# Patient Record
Sex: FEMALE
[De-identification: names, ages, dates, MRNs, and addresses within clinical notes are randomized per-mention and may not be internally consistent; named-entity substitution may affect disease eponyms.]

---

## 2021-10-05 ENCOUNTER — APPOINTMENT (OUTPATIENT)
Dept: CT IMAGING | Facility: CLINIC | Age: 61
End: 2021-10-05

## 2021-12-09 ENCOUNTER — APPOINTMENT (OUTPATIENT)
Dept: ORTHOPEDIC SURGERY | Facility: CLINIC | Age: 61
End: 2021-12-09
Payer: COMMERCIAL

## 2021-12-09 VITALS — BODY MASS INDEX: 27.31 KG/M2 | HEIGHT: 64 IN | WEIGHT: 160 LBS

## 2021-12-09 DIAGNOSIS — Z78.9 OTHER SPECIFIED HEALTH STATUS: ICD-10-CM

## 2021-12-09 DIAGNOSIS — Z72.3 LACK OF PHYSICAL EXERCISE: ICD-10-CM

## 2021-12-09 PROCEDURE — 99204 OFFICE O/P NEW MOD 45 MIN: CPT

## 2021-12-10 PROBLEM — Z78.9 NON-SMOKER: Status: ACTIVE | Noted: 2021-12-09

## 2021-12-10 PROBLEM — Z72.3 DOES NOT EXERCISE: Status: ACTIVE | Noted: 2021-12-09

## 2021-12-10 PROBLEM — Z78.9 DOES NOT USE ILLICIT DRUGS: Status: ACTIVE | Noted: 2021-12-09

## 2021-12-15 NOTE — HISTORY OF PRESENT ILLNESS
[de-identified] : 61 year old female presents with acute exacerbation of chronic back pain radiating down her right leg.  She denies injury.  She has had epidural injections in the past without relief.  She denies recent illness, fevers, numbness, weakness, balance problems, saddle anesthesia, urinary retention or fecal incontinence.

## 2021-12-15 NOTE — ASSESSMENT
[FreeTextEntry1] : 61 year old female presents with acute exacerbation of chronic back pain radiating down her right leg. She is otherwise neurologically intact. We reviewed her radiographs and lumbar MRI.  She has degenerative changes without compression of the neural elements.  She was given prescriptions for gabapentin and meloxicam.  She was given a referral for physical therapy. She will followup in 6 weeks. We discussed red flag symptoms that would require emergent evaluation. She knows to call with any questions or concerns or if her symptoms acutely worsen.

## 2021-12-15 NOTE — PHYSICAL EXAM
[de-identified] : General: No acute distress, conversant, well-nourished.\par Head: Normocephalic, atraumatic\par Neck: trachea midline, FROM\par Heart: normotensive and normal rate and rhythm\par Lungs: No labored breathing\par Skin: No abrasions, no rashes, no edema\par Psych: Alert and oriented to person, place and time\par Extremities: no peripheral edema or digital cyanosis\par Gait: Normal gait. Can perform tandem gait.  \par Vascular: warm and well perfused distally, palpable distal pulses\par \par MSK:\par Lumbar spine:\par No tenderness to palpation.  No step-off, no deformity.\par \par NEURO EXAM:\par Sensation \par Left L2  -  2/2            \par Left L3  -  2/2\par Left L4  -  2/2\par Left L5  -  2/2\par Left S1  -  2/2\par \par Right L2  -  2/2            \par Right L3  -  2/2\par Right L4  -  2/2\par Right L5  -  2/2\par Right S1  -  2/2\par \par Motor: \par Left L2 (hip flexion)                            5/5                \par Left L3 (knee extension)                   5/5                \par Left L4 (ankle dorsiflexion)                 5/5                \par Left L5 (long toe extensor)                5/5                \par Left S1 (ankle plantar flexion)           5/5\par \par Right L2 (hip flexion)                            5/5                \par Right L3 (knee extension)                   5/5                \par Right L4 (ankle dorsiflexion)                 5/5                \par Right L5 (long toe extensor)                5/5                \par Right S1 (ankle plantar flexion)           5/5\par \par Reflexes: Normal and symmetric\par Negative clonus.  Down-going Babinski.   [de-identified] : Lumbar Radiographs (10/9/21): Dextroscoliosis of the lumbar spine approximately 8.7 degrees\par \par Multilevel disc space narrowing with spondylosis and facet arthropathy, grossly unchanged from the prior study.\par \par No spondylolisthesis or dynamic instability\par \par Lumbar MRI (11/19/21): 1. L5-S1 annular bulge abutting the thecal sac with moderate foraminal narrowing.\par \par 2. L4-5 annular bulge with superimposed left-sided disc herniation and annular tear. There is bilateral recess stenosis and foraminal narrowing left significantly greater than right.\par \par 3. L3-4 annular bulge flattening the thecal sac with moderate foraminal narrowing.

## 2022-04-21 ENCOUNTER — APPOINTMENT (OUTPATIENT)
Dept: ORTHOPEDIC SURGERY | Facility: CLINIC | Age: 62
End: 2022-04-21
Payer: COMMERCIAL

## 2022-04-21 PROCEDURE — 99214 OFFICE O/P EST MOD 30 MIN: CPT

## 2022-05-10 NOTE — ASSESSMENT
[FreeTextEntry1] : 61 year old female followup with acute exacerbation of chronic back pain radiating down her right leg. She is otherwise neurologically intact. We reviewed her radiographs and lumbar MRI.  She has degenerative changes without compression of the neural elements.  She was given prescriptions for gabapentin and meloxicam.  She was given a referral for physical therapy. She will followup in 6 weeks. We discussed red flag symptoms that would require emergent evaluation. She knows to call with any questions or concerns or if her symptoms acutely worsen.

## 2022-05-10 NOTE — HISTORY OF PRESENT ILLNESS
[de-identified] : 61 year old female followup with acute exacerbation of chronic back pain radiating down her right leg.  She denies injury.  She denies recent illness, fevers, numbness, weakness, balance problems, saddle anesthesia, urinary retention or fecal incontinence. SInce her last appointment she has not had much change in her symptoms.  She has not been able to start PT.

## 2022-05-10 NOTE — PHYSICAL EXAM
[de-identified] : General: No acute distress, conversant, well-nourished.\par Head: Normocephalic, atraumatic\par Neck: trachea midline, FROM\par Heart: normotensive and normal rate and rhythm\par Lungs: No labored breathing\par Skin: No abrasions, no rashes, no edema\par Psych: Alert and oriented to person, place and time\par Extremities: no peripheral edema or digital cyanosis\par Gait: Normal gait. Can perform tandem gait.  \par Vascular: warm and well perfused distally, palpable distal pulses\par \par MSK:\par Lumbar spine:\par No tenderness to palpation.  No step-off, no deformity.\par \par NEURO EXAM:\par Sensation \par Left L2  -  2/2            \par Left L3  -  2/2\par Left L4  -  2/2\par Left L5  -  2/2\par Left S1  -  2/2\par \par Right L2  -  2/2            \par Right L3  -  2/2\par Right L4  -  2/2\par Right L5  -  2/2\par Right S1  -  2/2\par \par Motor: \par Left L2 (hip flexion)                            5/5                \par Left L3 (knee extension)                   5/5                \par Left L4 (ankle dorsiflexion)                 5/5                \par Left L5 (long toe extensor)                5/5                \par Left S1 (ankle plantar flexion)           5/5\par \par Right L2 (hip flexion)                            5/5                \par Right L3 (knee extension)                   5/5                \par Right L4 (ankle dorsiflexion)                 5/5                \par Right L5 (long toe extensor)                5/5                \par Right S1 (ankle plantar flexion)           5/5\par \par Reflexes: Normal and symmetric\par Negative clonus.  Down-going Babinski.   [de-identified] : Lumbar Radiographs (10/9/21): Dextroscoliosis of the lumbar spine approximately 8.7 degrees\par \par Multilevel disc space narrowing with spondylosis and facet arthropathy, grossly unchanged from the prior study.\par \par No spondylolisthesis or dynamic instability\par \par Lumbar MRI (11/19/21): 1. L5-S1 annular bulge abutting the thecal sac with moderate foraminal narrowing.\par \par 2. L4-5 annular bulge with superimposed left-sided disc herniation and annular tear. There is bilateral recess stenosis and foraminal narrowing left significantly greater than right.\par \par 3. L3-4 annular bulge flattening the thecal sac with moderate foraminal narrowing.

## 2022-06-21 ENCOUNTER — APPOINTMENT (OUTPATIENT)
Dept: ORTHOPEDIC SURGERY | Facility: CLINIC | Age: 62
End: 2022-06-21
Payer: COMMERCIAL

## 2022-06-21 PROCEDURE — 99214 OFFICE O/P EST MOD 30 MIN: CPT

## 2022-06-21 RX ORDER — GABAPENTIN 300 MG/1
300 CAPSULE ORAL 3 TIMES DAILY
Qty: 270 | Refills: 0 | Status: ACTIVE | COMMUNITY
Start: 2022-06-21 | End: 1900-01-01

## 2022-06-24 NOTE — HISTORY OF PRESENT ILLNESS
[de-identified] : 61 year old female followup with acute exacerbation of chronic back pain radiating down her right leg.  She denies injury.  She denies recent illness, fevers, numbness, weakness, balance problems, saddle anesthesia, urinary retention or fecal incontinence. Since her last appointment she has continued to have pain.  She has been doing PT without relief. She takes diclofenac and cyclobenzaprine which helps.

## 2022-06-24 NOTE — ASSESSMENT
[FreeTextEntry1] : 61 year old female followup with acute exacerbation of chronic back pain radiating down her right leg.  She is otherwise neurologically intact. Since her last appointment she has continued to have pain.  She has been doing PT without relief. She will be sent for a lumbar MRI.  She will continue diclofenac and cyclobenzaprine.  She will continue PT.  She will followup once the MRI has been completed.  We discussed red flag symptoms that would require emergent evaluation. She knows to call with any questions or concerns or if her symptoms acutely worsen.

## 2022-06-24 NOTE — PHYSICAL EXAM
[de-identified] : General: No acute distress, conversant, well-nourished.\par Head: Normocephalic, atraumatic\par Neck: trachea midline, FROM\par Heart: normotensive and normal rate and rhythm\par Lungs: No labored breathing\par Skin: No abrasions, no rashes, no edema\par Psych: Alert and oriented to person, place and time\par Extremities: no peripheral edema or digital cyanosis\par Gait: Normal gait. Can perform tandem gait.  \par Vascular: warm and well perfused distally, palpable distal pulses\par \par MSK:\par Lumbar spine:\par No tenderness to palpation.  No step-off, no deformity.\par \par NEURO EXAM:\par Sensation \par Left L2  -  2/2            \par Left L3  -  2/2\par Left L4  -  2/2\par Left L5  -  2/2\par Left S1  -  2/2\par \par Right L2  -  2/2            \par Right L3  -  2/2\par Right L4  -  2/2\par Right L5  -  2/2\par Right S1  -  2/2\par \par Motor: \par Left L2 (hip flexion)                            5/5                \par Left L3 (knee extension)                   5/5                \par Left L4 (ankle dorsiflexion)                 5/5                \par Left L5 (long toe extensor)                5/5                \par Left S1 (ankle plantar flexion)           5/5\par \par Right L2 (hip flexion)                            5/5                \par Right L3 (knee extension)                   5/5                \par Right L4 (ankle dorsiflexion)                 5/5                \par Right L5 (long toe extensor)                5/5                \par Right S1 (ankle plantar flexion)           5/5\par \par Reflexes: Normal and symmetric\par Negative clonus.  Down-going Babinski.   [de-identified] : Lumbar Radiographs (10/9/21): Dextroscoliosis of the lumbar spine approximately 8.7 degrees\par \par Multilevel disc space narrowing with spondylosis and facet arthropathy, grossly unchanged from the prior study.\par \par No spondylolisthesis or dynamic instability\par \par Lumbar MRI (11/19/21): 1. L5-S1 annular bulge abutting the thecal sac with moderate foraminal narrowing.\par \par 2. L4-5 annular bulge with superimposed left-sided disc herniation and annular tear. There is bilateral recess stenosis and foraminal narrowing left significantly greater than right.\par \par 3. L3-4 annular bulge flattening the thecal sac with moderate foraminal narrowing.

## 2022-06-30 ENCOUNTER — APPOINTMENT (OUTPATIENT)
Dept: ORTHOPEDIC SURGERY | Facility: CLINIC | Age: 62
End: 2022-06-30

## 2022-06-30 PROCEDURE — 99214 OFFICE O/P EST MOD 30 MIN: CPT

## 2022-06-30 NOTE — HISTORY OF PRESENT ILLNESS
[de-identified] : 61 year old female followup with acute exacerbation of chronic back pain radiating down her right leg.  She denies injury.  She denies recent illness, fevers, numbness, weakness, balance problems, saddle anesthesia, urinary retention or fecal incontinence. Since her last appointment she has continued to have pain. She takes diclofenac, gabapentin and cyclobenzaprine which helps. She is here to review her lumbar MRI.

## 2022-06-30 NOTE — PHYSICAL EXAM
[de-identified] : General: No acute distress, conversant, well-nourished.\par Head: Normocephalic, atraumatic\par Neck: trachea midline, FROM\par Heart: normotensive and normal rate and rhythm\par Lungs: No labored breathing\par Skin: No abrasions, no rashes, no edema\par Psych: Alert and oriented to person, place and time\par Extremities: no peripheral edema or digital cyanosis\par Gait: Normal gait. Can perform tandem gait.  \par Vascular: warm and well perfused distally, palpable distal pulses\par \par MSK:\par Lumbar spine:\par No tenderness to palpation.  No step-off, no deformity.\par \par NEURO EXAM:\par Sensation \par Left L2  -  2/2            \par Left L3  -  2/2\par Left L4  -  2/2\par Left L5  -  2/2\par Left S1  -  2/2\par \par Right L2  -  2/2            \par Right L3  -  2/2\par Right L4  -  2/2\par Right L5  -  2/2\par Right S1  -  2/2\par \par Motor: \par Left L2 (hip flexion)                            5/5                \par Left L3 (knee extension)                   5/5                \par Left L4 (ankle dorsiflexion)                 5/5                \par Left L5 (long toe extensor)                5/5                \par Left S1 (ankle plantar flexion)           5/5\par \par Right L2 (hip flexion)                            5/5                \par Right L3 (knee extension)                   5/5                \par Right L4 (ankle dorsiflexion)                 5/5                \par Right L5 (long toe extensor)                5/5                \par Right S1 (ankle plantar flexion)           5/5\par \par Reflexes: Normal and symmetric\par Negative clonus.  Down-going Babinski.   [de-identified] : Lumbar MRI (6/22/22): For purposes of this dictation, the last well-formed disc space will be labeled L5-S1.\par \par OSSEOUS STRUCTURES: Vertebral body heights are preserved. There is redemonstration of degenerative endplate marrow edema at L5-S1 and to a lesser extent at L4-5.\par \par ALIGNMENT: Normal lumbar lordosis is preserved. Mild dextroconvex lumbar curvature. No spondylolisthesis. No spondylolysis within the limitations of MRI.\par \par SPINAL CORD AND CONUS MEDULLARIS: Conus medullaris terminates at the L1 level.\par \par PARASPINAL AND INTRA-ABDOMINAL SOFT TISSUES: There is a partially imaged T2 hyperintensity in the right kidney.\par INCLUDED THORACIC SPINE AND SACRUM: Unremarkable.\par DISCS: There is mild disc space narrowing at L3-4 and multilevel disc desiccation from L3-4 through L5-S1, not substantially changed.\par The following axial levels are imaged and detailed below:\par L1-L2: No disc bulging or herniation. No spinal canal or foraminal stenosis.\par L2-L3: No disc bulging or herniation. No spinal canal or foraminal stenosis.\par L3-L4: There is mild diffuse disc bulging and mild to moderate facet arthrosis/ligamentum hypertrophy contributing to mild thecal sac impingement/mild spinal canal stenosis. There is mild bilateral foraminal narrowing. The findings are not substantially changed.\par L4-L5: There is moderate to marked diffuse disc bulging and mild to moderate facet arthrosis. There is mild spinal canal stenosis and contact of the traversing L5 nerve root sheaths bilaterally. There is moderate bilateral foraminal stenosis. The findings are not substantially changed.\par L5-S1: There is moderate to marked diffuse disc/osteophyte asymmetric to the right, and mild to moderate facet arthrosis contributing to impingement of the ventral thecal sac and traversing bilateral S1 nerve roots, severe right and mild left foraminal stenosis. The findings are not substantially changed.\par \par Lumbar Radiographs (10/9/21): Dextroscoliosis of the lumbar spine approximately 8.7 degrees\par \par Multilevel disc space narrowing with spondylosis and facet arthropathy, grossly unchanged from the prior study.\par \par No spondylolisthesis or dynamic instability\par \par Lumbar MRI (11/19/21): 1. L5-S1 annular bulge abutting the thecal sac with moderate foraminal narrowing.\par \par 2. L4-5 annular bulge with superimposed left-sided disc herniation and annular tear. There is bilateral recess stenosis and foraminal narrowing left significantly greater than right.\par \par 3. L3-4 annular bulge flattening the thecal sac with moderate foraminal narrowing.

## 2022-06-30 NOTE — ASSESSMENT
[FreeTextEntry1] : 61 year old female followup with acute exacerbation of chronic back pain radiating down her right leg.  She denies injury.  She is otherwise neurologically intact. Since her last appointment she has continued to have pain. We reviewed her lumbar MRI which shows degenerative changes including severe right L5-S1 foraminal stenosis.  This likely corresponds to her pattern of right L5 radicular pain.  We discussed treatment options including PT, medications, spinal injections and surgery. She will think about surgery.  She had a painful epidural injection in the past and does not want to proceed with further injections.  She will continue gabapentin, diclofenac and cyclobenzaprine. She will continue PT.  She is traveling to [place] and will followup upon her return in late August.  We discussed red flag symptoms that would require emergent evaluation. She knows to call with any questions or concerns or if her symptoms acutely worsen.

## 2022-10-11 ENCOUNTER — APPOINTMENT (OUTPATIENT)
Dept: ORTHOPEDIC SURGERY | Facility: CLINIC | Age: 62
End: 2022-10-11

## 2022-10-11 PROCEDURE — 99214 OFFICE O/P EST MOD 30 MIN: CPT

## 2022-11-07 ENCOUNTER — RX RENEWAL (OUTPATIENT)
Age: 62
End: 2022-11-07

## 2022-11-07 RX ORDER — GABAPENTIN 400 MG/1
400 CAPSULE ORAL TWICE DAILY
Qty: 60 | Refills: 0 | Status: ACTIVE | COMMUNITY
Start: 2022-10-11 | End: 1900-01-01

## 2022-11-10 ENCOUNTER — APPOINTMENT (OUTPATIENT)
Dept: ORTHOPEDIC SURGERY | Facility: CLINIC | Age: 62
End: 2022-11-10

## 2022-11-10 PROCEDURE — 99214 OFFICE O/P EST MOD 30 MIN: CPT | Mod: 25

## 2022-11-10 PROCEDURE — 73070 X-RAY EXAM OF ELBOW: CPT | Mod: RT

## 2022-11-10 PROCEDURE — 20551 NJX 1 TENDON ORIGIN/INSJ: CPT | Mod: RT

## 2022-11-10 RX ORDER — DICLOFENAC SODIUM 75 MG/1
75 TABLET, DELAYED RELEASE ORAL
Qty: 60 | Refills: 1 | Status: ACTIVE | COMMUNITY
Start: 2022-11-10 | End: 1900-01-01

## 2022-11-10 RX ORDER — CYCLOBENZAPRINE HYDROCHLORIDE 5 MG/1
5 TABLET, FILM COATED ORAL 3 TIMES DAILY
Qty: 30 | Refills: 1 | Status: ACTIVE | COMMUNITY
Start: 2022-11-10 | End: 1900-01-01

## 2022-11-11 NOTE — ASSESSMENT
[FreeTextEntry1] : 62 year old female followup with acute exacerbation of chronic back pain radiating down her right leg.  She denies injury.  She is otherwise neurologically intact. Her pain has continued.  She has degenerative changes including severe right L5-S1 foraminal stenosis.  This likely corresponds to her pattern of right L5 radicular pain.  We discussed treatment options including PT, medications, spinal injections and surgery. She will think about surgery.  The patient was given a referral for consideration for spinal injections.  She also has right elbow lateral epicondylitis for which she was given a steroid injection which she tolerated well.   She will continue gabapentin, diclofenac and cyclobenzaprine. She will continue PT. She will followup in 6 weeks.   We discussed red flag symptoms that would require emergent evaluation. She knows to call with any questions or concerns or if her symptoms acutely worsen.

## 2022-11-11 NOTE — HISTORY OF PRESENT ILLNESS
[de-identified] : 62 year old female followup with acute exacerbation of chronic back pain radiating down her right leg.  She denies injury.  She denies recent illness, fevers, numbness, weakness, balance problems, saddle anesthesia, urinary retention or fecal incontinence.  Her pain has remained the same since her last appointment. She takes diclofenac, gabapentin and cyclobenzaprine which provide relief.  She also has new orthopedic issue of right elbow pain worse with carrying items.

## 2022-11-11 NOTE — PHYSICAL EXAM
[de-identified] : General: No acute distress, conversant, well-nourished.\par Head: Normocephalic, atraumatic\par Neck: trachea midline, FROM\par Heart: normotensive and normal rate and rhythm\par Lungs: No labored breathing\par Skin: No abrasions, no rashes, no edema\par Psych: Alert and oriented to person, place and time\par Extremities: no peripheral edema or digital cyanosis\par Gait: Normal gait. Can perform tandem gait.  \par Vascular: warm and well perfused distally, palpable distal pulses\par \par MSK:\par Right elbow\par TTP over lateral epicondyle and extensor tendon origin\par pain with resisted extension and passive flexion. \par SILT m/r/u\par +motor EPL/FPL/EDC/FDP/IO\par WWP distally\par \par Lumbar spine:\par No tenderness to palpation.  No step-off, no deformity.\par \par NEURO EXAM:\par Sensation \par Left L2  -  2/2            \par Left L3  -  2/2\par Left L4  -  2/2\par Left L5  -  2/2\par Left S1  -  2/2\par \par Right L2  -  2/2            \par Right L3  -  2/2\par Right L4  -  2/2\par Right L5  -  2/2\par Right S1  -  2/2\par \par Motor: \par Left L2 (hip flexion)                            5/5                \par Left L3 (knee extension)                   5/5                \par Left L4 (ankle dorsiflexion)                 5/5                \par Left L5 (long toe extensor)                5/5                \par Left S1 (ankle plantar flexion)           5/5\par \par Right L2 (hip flexion)                            5/5                \par Right L3 (knee extension)                   5/5                \par Right L4 (ankle dorsiflexion)                 5/5                \par Right L5 (long toe extensor)                5/5                \par Right S1 (ankle plantar flexion)           5/5\par \par Reflexes: Normal and symmetric\par Negative clonus.  Down-going Babinski.   [de-identified] : I ordered radiographs to evaluate the patient's symptoms.\par \par Right elbow 3 view radiographs obtained in the office today shows no fracture or dislocation.  \par \par Lumbar MRI (6/22/22): For purposes of this dictation, the last well-formed disc space will be labeled L5-S1.\par \par OSSEOUS STRUCTURES: Vertebral body heights are preserved. There is redemonstration of degenerative endplate marrow edema at L5-S1 and to a lesser extent at L4-5.\par \par ALIGNMENT: Normal lumbar lordosis is preserved. Mild dextroconvex lumbar curvature. No spondylolisthesis. No spondylolysis within the limitations of MRI.\par \par SPINAL CORD AND CONUS MEDULLARIS: Conus medullaris terminates at the L1 level.\par \par PARASPINAL AND INTRA-ABDOMINAL SOFT TISSUES: There is a partially imaged T2 hyperintensity in the right kidney.\par INCLUDED THORACIC SPINE AND SACRUM: Unremarkable.\par DISCS: There is mild disc space narrowing at L3-4 and multilevel disc desiccation from L3-4 through L5-S1, not substantially changed.\par The following axial levels are imaged and detailed below:\par L1-L2: No disc bulging or herniation. No spinal canal or foraminal stenosis.\par L2-L3: No disc bulging or herniation. No spinal canal or foraminal stenosis.\par L3-L4: There is mild diffuse disc bulging and mild to moderate facet arthrosis/ligamentum hypertrophy contributing to mild thecal sac impingement/mild spinal canal stenosis. There is mild bilateral foraminal narrowing. The findings are not substantially changed.\par L4-L5: There is moderate to marked diffuse disc bulging and mild to moderate facet arthrosis. There is mild spinal canal stenosis and contact of the traversing L5 nerve root sheaths bilaterally. There is moderate bilateral foraminal stenosis. The findings are not substantially changed.\par L5-S1: There is moderate to marked diffuse disc/osteophyte asymmetric to the right, and mild to moderate facet arthrosis contributing to impingement of the ventral thecal sac and traversing bilateral S1 nerve roots, severe right and mild left foraminal stenosis. The findings are not substantially changed.\par \par Lumbar Radiographs (10/9/21): Dextroscoliosis of the lumbar spine approximately 8.7 degrees\par \par Multilevel disc space narrowing with spondylosis and facet arthropathy, grossly unchanged from the prior study.\par \par No spondylolisthesis or dynamic instability\par \par Lumbar MRI (11/19/21): 1. L5-S1 annular bulge abutting the thecal sac with moderate foraminal narrowing.\par \par 2. L4-5 annular bulge with superimposed left-sided disc herniation and annular tear. There is bilateral recess stenosis and foraminal narrowing left significantly greater than right.\par \par 3. L3-4 annular bulge flattening the thecal sac with moderate foraminal narrowing.

## 2022-11-11 NOTE — PROCEDURE
[de-identified] : Procedure: Right elbow lateral epicondylitis tendon injection with corticosteroid\par Pre-Procedure Diagnosis:Right elbow lateral epicondylitis\par Post-Procedure Diagnosis: same\par \par The patient was educated about the risks and benefits of a corticosteroid injection.  Alternatives were discussed.  The patient understood and consented for the procedure.\par \par The area was sterilely prepped using isopropyl alcohol.  An ethyl chloride spray provided  local anesthesia.  Using the usual sterile technique, 1 ml of 40mg/1ml of Kenalog and 2 ml of Lidocaine 1% without epinephrine was injected into the tendon origin.  A dressing was applied to the area.  The patient tolerated the procedure well and without complication.\par

## 2022-11-29 NOTE — ASSESSMENT
[FreeTextEntry1] : 62 year old female followup with acute exacerbation of chronic back pain radiating down her right leg.  She denies injury.  She is otherwise neurologically intact. Since her last visit the pain has continued. She has degenerative changes including severe right L5-S1 foraminal stenosis.  This corresponds to her pattern of right L5 radicular pain.  We discussed treatment options including PT, medications, spinal injections and surgery. She does not want surgery at this time.  The patient was given a referral for consideration for spinal injections.  She will continue gabapentin, diclofenac and cyclobenzaprine. She will continue PT. She will followup in 6 weeks.   We discussed red flag symptoms that would require emergent evaluation. She knows to call with any questions or concerns or if her symptoms acutely worsen.

## 2022-11-29 NOTE — HISTORY OF PRESENT ILLNESS
[de-identified] : 62 year old female followup with acute exacerbation of chronic back pain radiating down her right leg.  She denies injury.  She denies recent illness, fevers, numbness, weakness, balance problems, saddle anesthesia, urinary retention or fecal incontinence.  Since her last visit the pain has continued.  She sees relief with gabapentin, diclofenac and cyclobenzaprine.

## 2022-11-29 NOTE — PHYSICAL EXAM
[de-identified] : General: No acute distress, conversant, well-nourished.\par Head: Normocephalic, atraumatic\par Neck: trachea midline, FROM\par Heart: normotensive and normal rate and rhythm\par Lungs: No labored breathing\par Skin: No abrasions, no rashes, no edema\par Psych: Alert and oriented to person, place and time\par Extremities: no peripheral edema or digital cyanosis\par Gait: Normal gait. Can perform tandem gait.  \par Vascular: warm and well perfused distally, palpable distal pulses\par \par MSK:\par Right elbow\par TTP over lateral epicondyle and extensor tendon origin\par pain with resisted extension and passive flexion. \par SILT m/r/u\par +motor EPL/FPL/EDC/FDP/IO\par WWP distally\par \par Lumbar spine:\par No tenderness to palpation.  No step-off, no deformity.\par \par NEURO EXAM:\par Sensation \par Left L2  -  2/2            \par Left L3  -  2/2\par Left L4  -  2/2\par Left L5  -  2/2\par Left S1  -  2/2\par \par Right L2  -  2/2            \par Right L3  -  2/2\par Right L4  -  2/2\par Right L5  -  2/2\par Right S1  -  2/2\par \par Motor: \par Left L2 (hip flexion)                            5/5                \par Left L3 (knee extension)                   5/5                \par Left L4 (ankle dorsiflexion)                 5/5                \par Left L5 (long toe extensor)                5/5                \par Left S1 (ankle plantar flexion)           5/5\par \par Right L2 (hip flexion)                            5/5                \par Right L3 (knee extension)                   5/5                \par Right L4 (ankle dorsiflexion)                 5/5                \par Right L5 (long toe extensor)                5/5                \par Right S1 (ankle plantar flexion)           5/5\par \par Reflexes: Normal and symmetric\par Negative clonus.  Down-going Babinski.   [de-identified] : Lumbar MRI (6/22/22): For purposes of this dictation, the last well-formed disc space will be labeled L5-S1.\par \par OSSEOUS STRUCTURES: Vertebral body heights are preserved. There is redemonstration of degenerative endplate marrow edema at L5-S1 and to a lesser extent at L4-5.\par \par ALIGNMENT: Normal lumbar lordosis is preserved. Mild dextroconvex lumbar curvature. No spondylolisthesis. No spondylolysis within the limitations of MRI.\par \par SPINAL CORD AND CONUS MEDULLARIS: Conus medullaris terminates at the L1 level.\par \par PARASPINAL AND INTRA-ABDOMINAL SOFT TISSUES: There is a partially imaged T2 hyperintensity in the right kidney.\par INCLUDED THORACIC SPINE AND SACRUM: Unremarkable.\par DISCS: There is mild disc space narrowing at L3-4 and multilevel disc desiccation from L3-4 through L5-S1, not substantially changed.\par The following axial levels are imaged and detailed below:\par L1-L2: No disc bulging or herniation. No spinal canal or foraminal stenosis.\par L2-L3: No disc bulging or herniation. No spinal canal or foraminal stenosis.\par L3-L4: There is mild diffuse disc bulging and mild to moderate facet arthrosis/ligamentum hypertrophy contributing to mild thecal sac impingement/mild spinal canal stenosis. There is mild bilateral foraminal narrowing. The findings are not substantially changed.\par L4-L5: There is moderate to marked diffuse disc bulging and mild to moderate facet arthrosis. There is mild spinal canal stenosis and contact of the traversing L5 nerve root sheaths bilaterally. There is moderate bilateral foraminal stenosis. The findings are not substantially changed.\par L5-S1: There is moderate to marked diffuse disc/osteophyte asymmetric to the right, and mild to moderate facet arthrosis contributing to impingement of the ventral thecal sac and traversing bilateral S1 nerve roots, severe right and mild left foraminal stenosis. The findings are not substantially changed.\par \par Lumbar Radiographs (10/9/21): Dextroscoliosis of the lumbar spine approximately 8.7 degrees\par \par Multilevel disc space narrowing with spondylosis and facet arthropathy, grossly unchanged from the prior study.\par \par No spondylolisthesis or dynamic instability\par \par Lumbar MRI (11/19/21): 1. L5-S1 annular bulge abutting the thecal sac with moderate foraminal narrowing.\par \par 2. L4-5 annular bulge with superimposed left-sided disc herniation and annular tear. There is bilateral recess stenosis and foraminal narrowing left significantly greater than right.\par \par 3. L3-4 annular bulge flattening the thecal sac with moderate foraminal narrowing.

## 2022-12-20 ENCOUNTER — APPOINTMENT (OUTPATIENT)
Dept: ORTHOPEDIC SURGERY | Facility: CLINIC | Age: 62
End: 2022-12-20

## 2022-12-20 PROCEDURE — 99214 OFFICE O/P EST MOD 30 MIN: CPT

## 2022-12-23 ENCOUNTER — APPOINTMENT (OUTPATIENT)
Dept: PHYSICAL MEDICINE AND REHAB | Facility: CLINIC | Age: 62
End: 2022-12-23

## 2022-12-23 PROCEDURE — 99203 OFFICE O/P NEW LOW 30 MIN: CPT

## 2022-12-23 RX ORDER — CYCLOBENZAPRINE HYDROCHLORIDE 5 MG/1
5 TABLET, FILM COATED ORAL 3 TIMES DAILY
Qty: 90 | Refills: 1 | Status: DISCONTINUED | COMMUNITY
Start: 2022-04-21 | End: 2022-12-23

## 2022-12-23 RX ORDER — GABAPENTIN 300 MG/1
300 CAPSULE ORAL 3 TIMES DAILY
Qty: 90 | Refills: 0 | Status: DISCONTINUED | COMMUNITY
Start: 2021-12-13 | End: 2022-12-23

## 2022-12-23 RX ORDER — ISOSORBIDE MONONITRATE 30 MG/1
30 TABLET, EXTENDED RELEASE ORAL
Qty: 30 | Refills: 0 | Status: ACTIVE | COMMUNITY
Start: 2022-07-06

## 2022-12-23 RX ORDER — NAPROXEN 500 MG/1
500 TABLET ORAL
Qty: 14 | Refills: 0 | Status: ACTIVE | COMMUNITY
Start: 2022-08-27

## 2022-12-23 RX ORDER — ESTRADIOL 0.1 MG/G
0.1 CREAM VAGINAL
Qty: 42 | Refills: 0 | Status: ACTIVE | COMMUNITY
Start: 2022-11-16

## 2022-12-23 RX ORDER — HYDROCHLOROTHIAZIDE 12.5 MG/1
12.5 TABLET ORAL
Qty: 30 | Refills: 0 | Status: DISCONTINUED | COMMUNITY
Start: 2022-05-18 | End: 2022-12-23

## 2022-12-23 RX ORDER — DICLOFENAC SODIUM 75 MG/1
75 TABLET, DELAYED RELEASE ORAL
Qty: 270 | Refills: 0 | Status: DISCONTINUED | COMMUNITY
Start: 2022-06-21 | End: 2022-12-23

## 2022-12-23 RX ORDER — AMLODIPINE BESYLATE 5 MG/1
5 TABLET ORAL
Qty: 30 | Refills: 0 | Status: ACTIVE | COMMUNITY
Start: 2022-09-12

## 2022-12-23 RX ORDER — LATANOPROST/PF 0.005 %
0.01 DROPS OPHTHALMIC (EYE)
Qty: 2 | Refills: 0 | Status: ACTIVE | COMMUNITY
Start: 2022-11-16

## 2022-12-23 RX ORDER — HYDROCHLOROTHIAZIDE 12.5 MG/1
12.5 CAPSULE ORAL
Qty: 30 | Refills: 0 | Status: ACTIVE | COMMUNITY
Start: 2022-09-12

## 2022-12-23 RX ORDER — DICLOFENAC SODIUM 75 MG/1
75 TABLET, DELAYED RELEASE ORAL
Qty: 60 | Refills: 1 | Status: DISCONTINUED | COMMUNITY
Start: 2022-04-21 | End: 2022-12-23

## 2022-12-23 RX ORDER — TIMOLOL 5.12 MG/ML
0.5 SOLUTION/ DROPS OPHTHALMIC
Qty: 5 | Refills: 0 | Status: ACTIVE | COMMUNITY
Start: 2022-08-31

## 2022-12-23 RX ORDER — METOPROLOL SUCCINATE 25 MG/1
25 TABLET, EXTENDED RELEASE ORAL
Qty: 90 | Refills: 0 | Status: ACTIVE | COMMUNITY
Start: 2022-09-28

## 2022-12-23 RX ORDER — DICLOFENAC SODIUM 75 MG/1
75 TABLET, DELAYED RELEASE ORAL
Qty: 60 | Refills: 1 | Status: DISCONTINUED | COMMUNITY
Start: 2022-10-11 | End: 2022-12-23

## 2022-12-23 NOTE — PHYSICAL EXAM
[FreeTextEntry1] : PATRICIA is a 62 year old female \par Constitutional: healthy appearing, NAD, and normal body habitus\par \par LUMBAR\par ROM: flexion to 30 deg, ext to 5 deg\par \par Gait: normal\par \par Inspection: no erythema, warmth\par Spine: no TTP in spinous process\par Bony palpation: no TTP in GT\par \par Soft tissue palpation hip: no TTP in gluteus carl\par Soft tissue palpation of spine: no TTP in lumbar paraspinals\par \par 5/5 bilateral KE, DF, PF \par sensation intact in bilat LE\par \par Special tests: neg seated SLR\par \par

## 2022-12-23 NOTE — ASSESSMENT
[FreeTextEntry1] : MRI lumbar reviewed showing L4/5 and L5/S1 NF stenosis with L5 and S1 impingement.\par \par Discussed diagnosis and treatment plan including PT.\par She has tried conservative tx including PT, nsaids for 3 months without relief. Discussed NATALIE in detail.  Risks include bleeding, increased BP, immunosuppression.  Stop nsaids 2 days before.\par \par She will continue a comprehensive rehabilitation program afterward, as this is important to prevent recurrence of pain.\par Reviewed exercise to do. Get back to biking.

## 2022-12-28 NOTE — PHYSICAL EXAM
[de-identified] : General: No acute distress, conversant, well-nourished.\par Head: Normocephalic, atraumatic\par Neck: trachea midline, FROM\par Heart: normotensive and normal rate and rhythm\par Lungs: No labored breathing\par Skin: No abrasions, no rashes, no edema\par Psych: Alert and oriented to person, place and time\par Extremities: no peripheral edema or digital cyanosis\par Gait: Normal gait. Can perform tandem gait.  \par Vascular: warm and well perfused distally, palpable distal pulses\par \par MSK:\par Right elbow\par TTP over lateral epicondyle and extensor tendon origin\par pain with resisted extension and passive flexion. \par SILT m/r/u\par +motor EPL/FPL/EDC/FDP/IO\par WWP distally\par \par Lumbar spine:\par No tenderness to palpation.  No step-off, no deformity.\par \par NEURO EXAM:\par Sensation \par Left L2  -  2/2            \par Left L3  -  2/2\par Left L4  -  2/2\par Left L5  -  2/2\par Left S1  -  2/2\par \par Right L2  -  2/2            \par Right L3  -  2/2\par Right L4  -  2/2\par Right L5  -  2/2\par Right S1  -  2/2\par \par Motor: \par Left L2 (hip flexion)                            5/5                \par Left L3 (knee extension)                   5/5                \par Left L4 (ankle dorsiflexion)                 5/5                \par Left L5 (long toe extensor)                5/5                \par Left S1 (ankle plantar flexion)           5/5\par \par Right L2 (hip flexion)                            5/5                \par Right L3 (knee extension)                   5/5                \par Right L4 (ankle dorsiflexion)                 5/5                \par Right L5 (long toe extensor)                5/5                \par Right S1 (ankle plantar flexion)           5/5\par \par Reflexes: Normal and symmetric\par Negative clonus.  Down-going Babinski.   [de-identified] : Lumbar MRI (6/22/22): For purposes of this dictation, the last well-formed disc space will be labeled L5-S1.\par \par OSSEOUS STRUCTURES: Vertebral body heights are preserved. There is redemonstration of degenerative endplate marrow edema at L5-S1 and to a lesser extent at L4-5.\par \par ALIGNMENT: Normal lumbar lordosis is preserved. Mild dextroconvex lumbar curvature. No spondylolisthesis. No spondylolysis within the limitations of MRI.\par \par SPINAL CORD AND CONUS MEDULLARIS: Conus medullaris terminates at the L1 level.\par \par PARASPINAL AND INTRA-ABDOMINAL SOFT TISSUES: There is a partially imaged T2 hyperintensity in the right kidney.\par INCLUDED THORACIC SPINE AND SACRUM: Unremarkable.\par DISCS: There is mild disc space narrowing at L3-4 and multilevel disc desiccation from L3-4 through L5-S1, not substantially changed.\par The following axial levels are imaged and detailed below:\par L1-L2: No disc bulging or herniation. No spinal canal or foraminal stenosis.\par L2-L3: No disc bulging or herniation. No spinal canal or foraminal stenosis.\par L3-L4: There is mild diffuse disc bulging and mild to moderate facet arthrosis/ligamentum hypertrophy contributing to mild thecal sac impingement/mild spinal canal stenosis. There is mild bilateral foraminal narrowing. The findings are not substantially changed.\par L4-L5: There is moderate to marked diffuse disc bulging and mild to moderate facet arthrosis. There is mild spinal canal stenosis and contact of the traversing L5 nerve root sheaths bilaterally. There is moderate bilateral foraminal stenosis. The findings are not substantially changed.\par L5-S1: There is moderate to marked diffuse disc/osteophyte asymmetric to the right, and mild to moderate facet arthrosis contributing to impingement of the ventral thecal sac and traversing bilateral S1 nerve roots, severe right and mild left foraminal stenosis. The findings are not substantially changed.\par \par Lumbar Radiographs (10/9/21): Dextroscoliosis of the lumbar spine approximately 8.7 degrees\par \par Multilevel disc space narrowing with spondylosis and facet arthropathy, grossly unchanged from the prior study.\par \par No spondylolisthesis or dynamic instability\par \par Lumbar MRI (11/19/21): 1. L5-S1 annular bulge abutting the thecal sac with moderate foraminal narrowing.\par \par 2. L4-5 annular bulge with superimposed left-sided disc herniation and annular tear. There is bilateral recess stenosis and foraminal narrowing left significantly greater than right.\par \par 3. L3-4 annular bulge flattening the thecal sac with moderate foraminal narrowing.

## 2022-12-28 NOTE — HISTORY OF PRESENT ILLNESS
[de-identified] : 62 year old female followup with acute exacerbation of chronic back pain radiating down her right leg.  She denies injury.  She denies recent illness, fevers, numbness, weakness, balance problems, saddle anesthesia, urinary retention or fecal incontinence.  Since her last visit the pain has continued to progress.  She takes gabapentin, diclofenac and cyclobenzaprine with some relief.

## 2023-01-18 ENCOUNTER — APPOINTMENT (OUTPATIENT)
Dept: PHYSICAL MEDICINE AND REHAB | Facility: CLINIC | Age: 63
End: 2023-01-18
Payer: COMMERCIAL

## 2023-01-18 PROCEDURE — 64484 NJX AA&/STRD TFRM EPI L/S EA: CPT | Mod: RT

## 2023-01-18 PROCEDURE — 64483 NJX AA&/STRD TFRM EPI L/S 1: CPT | Mod: RT

## 2023-01-18 NOTE — PROCEDURE
[de-identified] : indication: pain\par \par Fluoroscopically Guided, Contrast Enhanced, Right L5 and S1 Epidural Steroid Injection\par \par After informed consent, She was placed prone on the fluoroscopy table. Skin over the area was prepped and draped in the usual sterile fashion before being anesthetized with 1% Lidocaine. Then using an oblique approach, a 22 gauge 5 in spinal needle was directed down to the L5/S1 NF.   Needle placement was confirmed with AP fluoroscopic images. After negative aspiration for blood or cerebrospinal fluid, contrast was instilled under live fluoroscopy and an epidurogram was obtained. It showed good epidural flow without any vascular uptake. Then a steroid solution consisting of 20 mg of Kenalog, 1 ml of 0.5% Lidocaine was instilled. \par \par Then using a 22 gauge 3.5 in spinal needle was directed down to the S1 foramen.   Needle placement was confirmed with lateral fluoroscopic images. After negative aspiration for blood or cerebrospinal fluid, contrast was instilled under live fluoroscopy and an epidurogram was obtained. It showed good epidural flow without any vascular uptake. Then a steroid solution consisting of 20 mg of Kenalog, 1.5 ml of 0.5% Lidocaine was instilled. \par \par  She  was discharged in good condition.  Instructions given:  No soaking or bathing for 2 days but can take a shower.  No strenuous exercise for 4 days. \par \par \par \par Manufacture GE\par Omnipaque 240\par NDC [id number]\par Exp 5/4/24\par [id number]\par \par Triamcinolone\par NDC [address]-2\par Exp 6/24\par Lot [id number]\par Manufacture Amneal\par \par \par Lidocaine\par Hospira\par [id number]\par Lot [id number]\par Exp 5/1/23\par \par

## 2023-01-18 NOTE — ASSESSMENT
[FreeTextEntry1] : She was a bit wobbly getting up but recovered quickly.  5/5 PF, DF.  No numbness in legs. \par \par She  denies being sick or having f/c, SOB, cough.  She  understands risk of immunosuppression from steroids.  Do not get covid vaccine for 2 wk.\par \par f/u 2 wk

## 2023-02-06 ENCOUNTER — APPOINTMENT (OUTPATIENT)
Dept: PHYSICAL MEDICINE AND REHAB | Facility: CLINIC | Age: 63
End: 2023-02-06
Payer: COMMERCIAL

## 2023-02-06 PROCEDURE — 99214 OFFICE O/P EST MOD 30 MIN: CPT

## 2023-02-06 NOTE — PHYSICAL EXAM
[FreeTextEntry1] : PATRICIA is a 62 year old female \par Constitutional: healthy appearing, NAD, and normal body habitus\par \par LUMBAR\par ROM: flexion to 30 deg, ext to 5 deg\par \par Gait: normal\par \par Inspection: no erythema, warmth\par Spine: no TTP in spinous process\par Bony palpation: no TTP in GT\par \par Soft tissue palpation hip: no TTP in gluteus carl\par Soft tissue palpation of spine: no TTP in lumbar paraspinals\par \par 5/5 bilateral KE, DF, PF \par sensation intact in bilat LE

## 2023-02-06 NOTE — ASSESSMENT
[FreeTextEntry1] : MRI lumbar reviewed showing L4/5 and L5/S1 NF stenosis with L5 and S1 impingement.\par \par Discussed diagnosis and treatment plan including PT.\par Get back to HEP\par reviewed exercises to do\par get back to gym slowly; can swim and bike\par can wean off gabapentin\par \par f/u 4 wk

## 2023-02-21 ENCOUNTER — APPOINTMENT (OUTPATIENT)
Dept: ORTHOPEDIC SURGERY | Facility: CLINIC | Age: 63
End: 2023-02-21
Payer: COMMERCIAL

## 2023-02-21 PROCEDURE — 99214 OFFICE O/P EST MOD 30 MIN: CPT

## 2023-02-21 NOTE — ASSESSMENT
[FreeTextEntry1] : 62 year old female followup with acute exacerbation of chronic back pain radiating down her right leg.  She denies injury.  She is otherwise neurologically intact.  Since her last visit she had a right L5-S1 epidural injection by Dr. Rosa on 1/18/23.  She had almost complete relief of her symptoms but in the last few days the pain is starting to return. She has severe right L5-S1 foraminal stenosis. We discussed treatment options including PT, medications, spinal injections and surgery. She does not want surgery at this time.  She will followup with Dr. Rosa to discuss further injections.  She will continue meloxicam. She will continue PT. She will followup in 6 weeks.   We discussed red flag symptoms that would require emergent evaluation. She knows to call with any questions or concerns or if her symptoms acutely worsen.

## 2023-02-21 NOTE — PHYSICAL EXAM
[de-identified] : General: No acute distress, conversant, well-nourished.\par Head: Normocephalic, atraumatic\par Neck: trachea midline, FROM\par Heart: normotensive and normal rate and rhythm\par Lungs: No labored breathing\par Skin: No abrasions, no rashes, no edema\par Psych: Alert and oriented to person, place and time\par Extremities: no peripheral edema or digital cyanosis\par Gait: Normal gait. Can perform tandem gait.  \par Vascular: warm and well perfused distally, palpable distal pulses\par \par MSK:\par Right elbow\par TTP over lateral epicondyle and extensor tendon origin\par pain with resisted extension and passive flexion. \par SILT m/r/u\par +motor EPL/FPL/EDC/FDP/IO\par WWP distally\par \par Lumbar spine:\par No tenderness to palpation.  No step-off, no deformity.\par \par NEURO EXAM:\par Sensation \par Left L2  -  2/2            \par Left L3  -  2/2\par Left L4  -  2/2\par Left L5  -  2/2\par Left S1  -  2/2\par \par Right L2  -  2/2            \par Right L3  -  2/2\par Right L4  -  2/2\par Right L5  -  2/2\par Right S1  -  2/2\par \par Motor: \par Left L2 (hip flexion)                            5/5                \par Left L3 (knee extension)                   5/5                \par Left L4 (ankle dorsiflexion)                 5/5                \par Left L5 (long toe extensor)                5/5                \par Left S1 (ankle plantar flexion)           5/5\par \par Right L2 (hip flexion)                            5/5                \par Right L3 (knee extension)                   5/5                \par Right L4 (ankle dorsiflexion)                 5/5                \par Right L5 (long toe extensor)                5/5                \par Right S1 (ankle plantar flexion)           5/5\par \par Reflexes: Normal and symmetric\par Negative clonus.  Down-going Babinski.   [de-identified] : Lumbar MRI (6/22/22): For purposes of this dictation, the last well-formed disc space will be labeled L5-S1.\par \par OSSEOUS STRUCTURES: Vertebral body heights are preserved. There is redemonstration of degenerative endplate marrow edema at L5-S1 and to a lesser extent at L4-5.\par \par ALIGNMENT: Normal lumbar lordosis is preserved. Mild dextroconvex lumbar curvature. No spondylolisthesis. No spondylolysis within the limitations of MRI.\par \par SPINAL CORD AND CONUS MEDULLARIS: Conus medullaris terminates at the L1 level.\par \par PARASPINAL AND INTRA-ABDOMINAL SOFT TISSUES: There is a partially imaged T2 hyperintensity in the right kidney.\par INCLUDED THORACIC SPINE AND SACRUM: Unremarkable.\par DISCS: There is mild disc space narrowing at L3-4 and multilevel disc desiccation from L3-4 through L5-S1, not substantially changed.\par The following axial levels are imaged and detailed below:\par L1-L2: No disc bulging or herniation. No spinal canal or foraminal stenosis.\par L2-L3: No disc bulging or herniation. No spinal canal or foraminal stenosis.\par L3-L4: There is mild diffuse disc bulging and mild to moderate facet arthrosis/ligamentum hypertrophy contributing to mild thecal sac impingement/mild spinal canal stenosis. There is mild bilateral foraminal narrowing. The findings are not substantially changed.\par L4-L5: There is moderate to marked diffuse disc bulging and mild to moderate facet arthrosis. There is mild spinal canal stenosis and contact of the traversing L5 nerve root sheaths bilaterally. There is moderate bilateral foraminal stenosis. The findings are not substantially changed.\par L5-S1: There is moderate to marked diffuse disc/osteophyte asymmetric to the right, and mild to moderate facet arthrosis contributing to impingement of the ventral thecal sac and traversing bilateral S1 nerve roots, severe right and mild left foraminal stenosis. The findings are not substantially changed.\par \par Lumbar Radiographs (10/9/21): Dextroscoliosis of the lumbar spine approximately 8.7 degrees\par \par Multilevel disc space narrowing with spondylosis and facet arthropathy, grossly unchanged from the prior study.\par \par No spondylolisthesis or dynamic instability\par \par Lumbar MRI (11/19/21): 1. L5-S1 annular bulge abutting the thecal sac with moderate foraminal narrowing.\par \par 2. L4-5 annular bulge with superimposed left-sided disc herniation and annular tear. There is bilateral recess stenosis and foraminal narrowing left significantly greater than right.\par \par 3. L3-4 annular bulge flattening the thecal sac with moderate foraminal narrowing.

## 2023-02-21 NOTE — HISTORY OF PRESENT ILLNESS
[de-identified] : 62 year old female followup with acute exacerbation of chronic back pain radiating down her right leg.  She denies injury.  She denies recent illness, fevers, numbness, weakness, balance problems, saddle anesthesia, urinary retention or fecal incontinence.  Since her last visit she had a right L5-S1 epidural injection by Dr. Rosa on 1/18/23.  She had almost complete relief of her symptoms but in the last few days the pain is starting to return. She is not currently taking any medications because the pain is improved.

## 2023-02-21 NOTE — REASON FOR VISIT
[Follow-Up Visit] : a follow-up visit for [Back Pain] : back pain [FreeTextEntry2] : Currently experiencing pain in the R glute

## 2023-03-06 ENCOUNTER — APPOINTMENT (OUTPATIENT)
Dept: PHYSICAL MEDICINE AND REHAB | Facility: CLINIC | Age: 63
End: 2023-03-06
Payer: COMMERCIAL

## 2023-03-06 PROCEDURE — 20552 NJX 1/MLT TRIGGER POINT 1/2: CPT

## 2023-03-06 PROCEDURE — 99214 OFFICE O/P EST MOD 30 MIN: CPT | Mod: 25

## 2023-03-06 NOTE — HISTORY OF PRESENT ILLNESS
[FreeTextEntry1] : Location: back \par Severity: 9/10, worse lately\par Duration: over 2 yr\par Aggravating Factors: walking\par Alleviating Factors: \par Associated Symptoms: denies weight loss, fever, chills, change in bowel/bladder habits, weakness, numbness/tingling, no radiation down right leg\par Prior Studies: MRI \par 1/2023 right L5 and S1 NATALIE - over 50% relief, no more leg pain \par  \par

## 2023-03-06 NOTE — PROCEDURE
[de-identified] : Indication: myofascial pain\par \par After informed consent,she elected to proceed with a trigger point injection into the right lateral gluteus carl. I confirmed no prior adverse reactions, no active infections, and no relevant allergies. \par  \par The skin was prepped in the usual sterile manner.  The sites were injected with Lidocaine followed by local needling. The injection was completed without complication and a bandage was applied. She tolerated the procedure well and was given post-injection instructions. \par  \par Cold Tx x 48 hours, analgesics prn. Medications: 0.5 ml of 1% Lidocaine per site\par \par Exp 5/2023\par Manufacture: Hikma\par NDC 3747-9592-79\par [id number]\par

## 2023-03-06 NOTE — ASSESSMENT
[FreeTextEntry1] : Discussed diagnosis and treatment plan including PT.\par Educated to do HEP regularly even if pain improves.\par Needs to bike 4x/wk given arthritis.\par Discussed facet injections and she elects to do it.  Right L4/5 facet at 45 deg, L5/S1 at 5 deg. \par educated to lose weight\par gave exercise sheet again. do more bridges\par \par

## 2023-03-27 ENCOUNTER — RX RENEWAL (OUTPATIENT)
Age: 63
End: 2023-03-27

## 2023-03-27 RX ORDER — MELOXICAM 15 MG/1
15 TABLET ORAL
Qty: 20 | Refills: 0 | Status: ACTIVE | COMMUNITY
Start: 2021-12-13 | End: 1900-01-01

## 2023-05-03 ENCOUNTER — APPOINTMENT (OUTPATIENT)
Dept: ORTHOPEDIC SURGERY | Facility: CLINIC | Age: 63
End: 2023-05-03
Payer: COMMERCIAL

## 2023-05-03 DIAGNOSIS — M48.062 SPINAL STENOSIS, LUMBAR REGION WITH NEUROGENIC CLAUDICATION: ICD-10-CM

## 2023-05-03 PROCEDURE — 99214 OFFICE O/P EST MOD 30 MIN: CPT | Mod: 25

## 2023-05-03 PROCEDURE — 20551 NJX 1 TENDON ORIGIN/INSJ: CPT

## 2023-05-03 RX ORDER — MELOXICAM 15 MG/1
15 TABLET ORAL
Qty: 30 | Refills: 1 | Status: ACTIVE | COMMUNITY
Start: 2023-05-03 | End: 1900-01-01

## 2023-05-04 PROBLEM — M48.062 SPINAL STENOSIS OF LUMBAR REGION WITH NEUROGENIC CLAUDICATION: Status: ACTIVE | Noted: 2022-12-23

## 2023-05-04 NOTE — HISTORY OF PRESENT ILLNESS
[de-identified] : 62 year old female followup with acute exacerbation of chronic back pain radiating down her right leg.  She denies injury.  She denies recent illness, fevers, numbness, weakness, balance problems, saddle anesthesia, urinary retention or fecal incontinence.  Since her last visit she returned from traveling to [facility], [place] and [place].  She did a lot of walking. She reports a return of her back and leg pain.  She also reports right elbow pain.  Her last epidural was on 1/18/23.

## 2023-05-04 NOTE — ASSESSMENT
[FreeTextEntry1] : 62 year old female followup with acute exacerbation of chronic back pain radiating down her right leg.  She denies injury.  She is otherwise neurologically intact.  Since her last visit she returned from traveling to [facility], [place] and [place].  She did a lot of walking. She reports a return of her back and leg pain.  She also reports right elbow pain.  Her last epidural was on 1/18/23. She has severe right L5-S1 foraminal stenosis. We discussed treatment options including PT, medications, spinal injections and surgery. She does not want surgery at this time.  She will proceed with a new lumbar epidural with Dr. Rosa. She was given a right elbow injection which she tolerated well.  She will continue meloxicam. She will continue PT. She will followup in 6 weeks.   We discussed red flag symptoms that would require emergent evaluation. She knows to call with any questions or concerns or if her symptoms acutely worsen.

## 2023-05-04 NOTE — PROCEDURE
[de-identified] : Procedure: Right elbow lateral epicondylitis tendon injection with corticosteroid\par Pre-Procedure Diagnosis:Right elbow lateral epicondylitis\par Post-Procedure Diagnosis: same\par \par The patient was educated about the risks and benefits of a corticosteroid injection.  Alternatives were discussed.  The patient understood and consented for the procedure.\par \par The area was sterilely prepped using isopropyl alcohol.  An ethyl chloride spray provided  local anesthesia.  Using the usual sterile technique, 1 ml of 40mg/1ml of Kenalog and 2 ml of Lidocaine 1% without epinephrine was injected into the tendon origin.  A dressing was applied to the area.  The patient tolerated the procedure well and without complication.\par

## 2023-05-04 NOTE — PHYSICAL EXAM
[de-identified] : General: No acute distress, conversant, well-nourished.\par Head: Normocephalic, atraumatic\par Neck: trachea midline, FROM\par Heart: normotensive and normal rate and rhythm\par Lungs: No labored breathing\par Skin: No abrasions, no rashes, no edema\par Psych: Alert and oriented to person, place and time\par Extremities: no peripheral edema or digital cyanosis\par Gait: Normal gait. Can perform tandem gait.  \par Vascular: warm and well perfused distally, palpable distal pulses\par \par MSK:\par Right elbow\par TTP over lateral epicondyle and extensor tendon origin\par pain with resisted extension and passive flexion. \par SILT m/r/u\par +motor EPL/FPL/EDC/FDP/IO\par WWP distally\par \par Lumbar spine:\par No tenderness to palpation.  No step-off, no deformity.\par \par NEURO EXAM:\par Sensation \par Left L2  -  2/2            \par Left L3  -  2/2\par Left L4  -  2/2\par Left L5  -  2/2\par Left S1  -  2/2\par \par Right L2  -  2/2            \par Right L3  -  2/2\par Right L4  -  2/2\par Right L5  -  2/2\par Right S1  -  2/2\par \par Motor: \par Left L2 (hip flexion)                            5/5                \par Left L3 (knee extension)                   5/5                \par Left L4 (ankle dorsiflexion)                 5/5                \par Left L5 (long toe extensor)                5/5                \par Left S1 (ankle plantar flexion)           5/5\par \par Right L2 (hip flexion)                            5/5                \par Right L3 (knee extension)                   5/5                \par Right L4 (ankle dorsiflexion)                 5/5                \par Right L5 (long toe extensor)                5/5                \par Right S1 (ankle plantar flexion)           5/5\par \par Reflexes: Normal and symmetric\par Negative clonus.  Down-going Babinski.   [de-identified] : Lumbar MRI (6/22/22): For purposes of this dictation, the last well-formed disc space will be labeled L5-S1.\par \par OSSEOUS STRUCTURES: Vertebral body heights are preserved. There is redemonstration of degenerative endplate marrow edema at L5-S1 and to a lesser extent at L4-5.\par \par ALIGNMENT: Normal lumbar lordosis is preserved. Mild dextroconvex lumbar curvature. No spondylolisthesis. No spondylolysis within the limitations of MRI.\par \par SPINAL CORD AND CONUS MEDULLARIS: Conus medullaris terminates at the L1 level.\par \par PARASPINAL AND INTRA-ABDOMINAL SOFT TISSUES: There is a partially imaged T2 hyperintensity in the right kidney.\par INCLUDED THORACIC SPINE AND SACRUM: Unremarkable.\par DISCS: There is mild disc space narrowing at L3-4 and multilevel disc desiccation from L3-4 through L5-S1, not substantially changed.\par The following axial levels are imaged and detailed below:\par L1-L2: No disc bulging or herniation. No spinal canal or foraminal stenosis.\par L2-L3: No disc bulging or herniation. No spinal canal or foraminal stenosis.\par L3-L4: There is mild diffuse disc bulging and mild to moderate facet arthrosis/ligamentum hypertrophy contributing to mild thecal sac impingement/mild spinal canal stenosis. There is mild bilateral foraminal narrowing. The findings are not substantially changed.\par L4-L5: There is moderate to marked diffuse disc bulging and mild to moderate facet arthrosis. There is mild spinal canal stenosis and contact of the traversing L5 nerve root sheaths bilaterally. There is moderate bilateral foraminal stenosis. The findings are not substantially changed.\par L5-S1: There is moderate to marked diffuse disc/osteophyte asymmetric to the right, and mild to moderate facet arthrosis contributing to impingement of the ventral thecal sac and traversing bilateral S1 nerve roots, severe right and mild left foraminal stenosis. The findings are not substantially changed.\par \par Lumbar Radiographs (10/9/21): Dextroscoliosis of the lumbar spine approximately 8.7 degrees\par \par Multilevel disc space narrowing with spondylosis and facet arthropathy, grossly unchanged from the prior study.\par \par No spondylolisthesis or dynamic instability\par \par Lumbar MRI (11/19/21): 1. L5-S1 annular bulge abutting the thecal sac with moderate foraminal narrowing.\par \par 2. L4-5 annular bulge with superimposed left-sided disc herniation and annular tear. There is bilateral recess stenosis and foraminal narrowing left significantly greater than right.\par \par 3. L3-4 annular bulge flattening the thecal sac with moderate foraminal narrowing.

## 2023-06-14 ENCOUNTER — APPOINTMENT (OUTPATIENT)
Dept: ORTHOPEDIC SURGERY | Facility: CLINIC | Age: 63
End: 2023-06-14
Payer: COMMERCIAL

## 2023-06-14 DIAGNOSIS — M54.16 RADICULOPATHY, LUMBAR REGION: ICD-10-CM

## 2023-06-14 DIAGNOSIS — M77.11 LATERAL EPICONDYLITIS, RIGHT ELBOW: ICD-10-CM

## 2023-06-14 PROCEDURE — 99214 OFFICE O/P EST MOD 30 MIN: CPT

## 2023-06-14 RX ORDER — GABAPENTIN 400 MG/1
400 CAPSULE ORAL TWICE DAILY
Qty: 60 | Refills: 0 | Status: ACTIVE | COMMUNITY
Start: 2023-06-14 | End: 1900-01-01

## 2023-06-21 PROBLEM — M77.11 LATERAL EPICONDYLITIS OF RIGHT ELBOW: Status: ACTIVE | Noted: 2022-11-11

## 2023-06-21 PROBLEM — M54.16 LUMBAR RADICULOPATHY: Status: ACTIVE | Noted: 2021-12-09

## 2023-06-21 NOTE — PHYSICAL EXAM
[de-identified] : General: No acute distress, conversant, well-nourished.\par Head: Normocephalic, atraumatic\par Neck: trachea midline, FROM\par Heart: normotensive and normal rate and rhythm\par Lungs: No labored breathing\par Skin: No abrasions, no rashes, no edema\par Psych: Alert and oriented to person, place and time\par Extremities: no peripheral edema or digital cyanosis\par Gait: Normal gait. Can perform tandem gait.  \par Vascular: warm and well perfused distally, palpable distal pulses\par \par MSK:\par Right elbow\par TTP over lateral epicondyle and extensor tendon origin\par pain with resisted extension and passive flexion. \par SILT m/r/u\par +motor EPL/FPL/EDC/FDP/IO\par WWP distally\par \par Lumbar spine:\par No tenderness to palpation.  No step-off, no deformity.\par \par NEURO EXAM:\par Sensation \par Left L2  -  2/2            \par Left L3  -  2/2\par Left L4  -  2/2\par Left L5  -  2/2\par Left S1  -  2/2\par \par Right L2  -  2/2            \par Right L3  -  2/2\par Right L4  -  2/2\par Right L5  -  2/2\par Right S1  -  2/2\par \par Motor: \par Left L2 (hip flexion)                            5/5                \par Left L3 (knee extension)                   5/5                \par Left L4 (ankle dorsiflexion)                 5/5                \par Left L5 (long toe extensor)                5/5                \par Left S1 (ankle plantar flexion)           5/5\par \par Right L2 (hip flexion)                            5/5                \par Right L3 (knee extension)                   5/5                \par Right L4 (ankle dorsiflexion)                 5/5                \par Right L5 (long toe extensor)                5/5                \par Right S1 (ankle plantar flexion)           5/5\par \par Reflexes: Normal and symmetric\par Negative clonus.  Down-going Babinski.   [de-identified] : Lumbar MRI (6/22/22): For purposes of this dictation, the last well-formed disc space will be labeled L5-S1.\par \par OSSEOUS STRUCTURES: Vertebral body heights are preserved. There is redemonstration of degenerative endplate marrow edema at L5-S1 and to a lesser extent at L4-5.\par \par ALIGNMENT: Normal lumbar lordosis is preserved. Mild dextroconvex lumbar curvature. No spondylolisthesis. No spondylolysis within the limitations of MRI.\par \par SPINAL CORD AND CONUS MEDULLARIS: Conus medullaris terminates at the L1 level.\par \par PARASPINAL AND INTRA-ABDOMINAL SOFT TISSUES: There is a partially imaged T2 hyperintensity in the right kidney.\par INCLUDED THORACIC SPINE AND SACRUM: Unremarkable.\par DISCS: There is mild disc space narrowing at L3-4 and multilevel disc desiccation from L3-4 through L5-S1, not substantially changed.\par The following axial levels are imaged and detailed below:\par L1-L2: No disc bulging or herniation. No spinal canal or foraminal stenosis.\par L2-L3: No disc bulging or herniation. No spinal canal or foraminal stenosis.\par L3-L4: There is mild diffuse disc bulging and mild to moderate facet arthrosis/ligamentum hypertrophy contributing to mild thecal sac impingement/mild spinal canal stenosis. There is mild bilateral foraminal narrowing. The findings are not substantially changed.\par L4-L5: There is moderate to marked diffuse disc bulging and mild to moderate facet arthrosis. There is mild spinal canal stenosis and contact of the traversing L5 nerve root sheaths bilaterally. There is moderate bilateral foraminal stenosis. The findings are not substantially changed.\par L5-S1: There is moderate to marked diffuse disc/osteophyte asymmetric to the right, and mild to moderate facet arthrosis contributing to impingement of the ventral thecal sac and traversing bilateral S1 nerve roots, severe right and mild left foraminal stenosis. The findings are not substantially changed.\par \par Lumbar Radiographs (10/9/21): Dextroscoliosis of the lumbar spine approximately 8.7 degrees\par \par Multilevel disc space narrowing with spondylosis and facet arthropathy, grossly unchanged from the prior study.\par \par No spondylolisthesis or dynamic instability\par \par Lumbar MRI (11/19/21): 1. L5-S1 annular bulge abutting the thecal sac with moderate foraminal narrowing.\par \par 2. L4-5 annular bulge with superimposed left-sided disc herniation and annular tear. There is bilateral recess stenosis and foraminal narrowing left significantly greater than right.\par \par 3. L3-4 annular bulge flattening the thecal sac with moderate foraminal narrowing.

## 2023-06-21 NOTE — ASSESSMENT
[FreeTextEntry1] : 62 year old female followup with acute exacerbation of chronic back pain radiating down her right leg.  She denies injury.  She is otherwise neurologically intact.  She reports worsening back and leg pain since her last visit.  She will be sent for a new lumbar MRI. Her elbow continues to bother her as well despite conservative treatment. She will be sent for an elbow MRI.  She can continue PT.  She can consider additional spinal injections.  She can continue meloxicam.  She was given gabapentin.  She will followup after her MRIs. We discussed red flag symptoms that would require emergent evaluation. She knows to call with any questions or concerns or if her symptoms acutely worsen.

## 2023-06-21 NOTE — HISTORY OF PRESENT ILLNESS
[de-identified] : 62 year old female followup with acute exacerbation of chronic back pain radiating down her right leg.  She denies injury.  She denies recent illness, fevers, numbness, weakness, balance problems, saddle anesthesia, urinary retention or fecal incontinence.  Her last epidural was on 1/18/23. She has worsened pain since her last visit.  Her elbow and back continue to bother her.

## 2023-07-11 ENCOUNTER — APPOINTMENT (OUTPATIENT)
Dept: ORTHOPEDIC SURGERY | Facility: CLINIC | Age: 63
End: 2023-07-11
Payer: COMMERCIAL

## 2023-07-11 DIAGNOSIS — M79.18 MYALGIA, OTHER SITE: ICD-10-CM

## 2023-07-11 DIAGNOSIS — M47.816 SPONDYLOSIS W/OUT MYELOPATHY OR RADICULOPATHY, LUMBAR REGION: ICD-10-CM

## 2023-07-11 DIAGNOSIS — M54.9 DORSALGIA, UNSPECIFIED: ICD-10-CM

## 2023-07-11 PROCEDURE — 99214 OFFICE O/P EST MOD 30 MIN: CPT

## 2023-07-11 RX ORDER — MELOXICAM 15 MG/1
15 TABLET ORAL
Qty: 30 | Refills: 1 | Status: ACTIVE | COMMUNITY
Start: 2023-07-11 | End: 1900-01-01

## 2023-07-11 RX ORDER — GABAPENTIN 400 MG/1
400 CAPSULE ORAL TWICE DAILY
Qty: 60 | Refills: 1 | Status: ACTIVE | COMMUNITY
Start: 2023-07-11 | End: 1900-01-01

## 2023-07-24 ENCOUNTER — APPOINTMENT (OUTPATIENT)
Dept: PHYSICAL MEDICINE AND REHAB | Facility: CLINIC | Age: 63
End: 2023-07-24
Payer: COMMERCIAL

## 2023-07-24 PROCEDURE — 64493 INJ PARAVERT F JNT L/S 1 LEV: CPT | Mod: RT

## 2023-07-24 PROCEDURE — 64494 INJ PARAVERT F JNT L/S 2 LEV: CPT | Mod: RT

## 2023-07-24 RX ORDER — DICLOFENAC SODIUM 1 MG/ML
0.1 SOLUTION OPHTHALMIC
Qty: 10 | Refills: 0 | Status: ACTIVE | COMMUNITY
Start: 2023-05-16

## 2023-07-24 RX ORDER — CLOTRIMAZOLE 10 MG/G
1 CREAM TOPICAL
Qty: 30 | Refills: 0 | Status: ACTIVE | COMMUNITY
Start: 2023-06-29

## 2023-07-24 NOTE — ASSESSMENT
[FreeTextEntry1] : She  denies being sick or having f/c, SOB, cough.  She  understands risk of immunosuppression from steroids.  Do not get covid vaccine for 2 wk.\par \par She had immediate relief.  f/u 2 wk

## 2023-07-24 NOTE — PROCEDURE
[de-identified] : indication: pain\par \par Fluoroscopically Guided, Contrast Enhanced, Right  L4/5 and L5/S1 Facet Injections\par \par After informed consent, she was placed prone on the fluoroscopy table. Skin over the area was prepped and draped in the usual sterile fashion before being anesthetized with 1% Lidocaine. Then using an oblique approach, a 5 in spinal needle was directed down to the L5/S1 facet joint. Needle placement was confirmed with AP fluoroscopic images. After negative aspiration for blood or cerebrospinal fluid, contrast was instilled under live fluoroscopy and an arthrogram was obtained. It showed good flow in the capsule without any vascular uptake. Then a steroid solution consisting of 20 mg of Kenalog and 1 ml of 1% Lidocaine was instilled. The same procedure was repeated at the    L4/5 facet joint. \par \par She tolerated the procedure well and was discharged in good condition without any complications or complaints.  She  was given discharge instructions and asked to follow-up in clinic in two weeks.\par \par \par Manufacture GE\par Omnipaque 240\par NDC [id number]\par Exp 5/4/24\par [id number]\par \par Lidocaine 1%\par Manufacture [employer]\par NDC 79443-221-78\par Exp 6/24\par LOT [id number]\par \par Triamcinolone\par NDC [address]-8\par Exp 6/24\par Lot [id number]\par Manufacture Amneal\par

## 2023-07-26 ENCOUNTER — APPOINTMENT (OUTPATIENT)
Dept: PHYSICAL MEDICINE AND REHAB | Facility: CLINIC | Age: 63
End: 2023-07-26

## 2023-08-03 ENCOUNTER — APPOINTMENT (OUTPATIENT)
Dept: PHYSICAL MEDICINE AND REHAB | Facility: CLINIC | Age: 63
End: 2023-08-03
Payer: COMMERCIAL

## 2023-08-03 PROCEDURE — 99213 OFFICE O/P EST LOW 20 MIN: CPT

## 2023-08-03 NOTE — ASSESSMENT
[FreeTextEntry1] : Discussed diagnosis and treatment plan including PT. Educated to do HEP regularly even if pain improves. Start biking 4x/wk Walk as tolerated can swim but needs to take lessons educated to lose weight do more bridges  f/u prn

## 2023-08-03 NOTE — PHYSICAL EXAM
[FreeTextEntry1] : PATRICIA is a 63 year old female  Constitutional: healthy appearing, NAD, and normal body habitus  LUMBAR  ROM: flexion to 30 deg, ext to 5 deg  Gait: normal Inspection: no erythema, warmth Spine: no TTP in spinous process Bony palpation: no TTP in GT   Soft tissue palpation hip: TTP in right gluteus carl laterally Soft tissue palpation of spine: no TTP in lumbar paraspinals  5/5 bilateral KE, DF, PF sensation intact in bilat LE

## 2023-08-03 NOTE — HISTORY OF PRESENT ILLNESS
[FreeTextEntry1] : Location: back Severity: 3/10, Duration: over 2 yr Aggravating Factors: walking Alleviating Factors: Associated Symptoms: denies weight loss, fever, chills, change in bowel/bladder habits, weakness, numbness/tingling, no radiation down right leg  Prior Studies: MRI  1/2023 right L5 and S1 NATALIE - over 50% relief, no more leg pain  7/2023 right L4/5 and L5/S1 facet injection - 70% relief

## 2023-08-13 PROBLEM — M54.9 BACK PAIN: Status: ACTIVE | Noted: 2021-12-09

## 2023-08-13 PROBLEM — M47.816 LUMBAR SPONDYLOSIS: Status: ACTIVE | Noted: 2023-03-06

## 2023-08-13 PROBLEM — M79.18 MYOFASCIAL PAIN: Status: ACTIVE | Noted: 2023-03-06

## 2023-08-13 NOTE — ASSESSMENT
[FreeTextEntry1] : 62 year old female followup with acute exacerbation of chronic back pain radiating down her right leg.  She denies injury.  She is otherwise neurologically intact.  Her back and leg pain have continued. We reviewed her lumbar and elbow MRIs. We discussed treatment options including physical therapy, medications, spinal injections and surgery.  She can continue PT.  She can proceed with a lumbar epidural with Dr. Rosa.  She can take meloxicam and gabapentin. She was given a referral to see Dr. Amezquita for her elbow.  She will followup in 4 weeks. We discussed red flag symptoms that would require emergent evaluation. She knows to call with any questions or concerns or if her symptoms acutely worsen.

## 2023-08-13 NOTE — HISTORY OF PRESENT ILLNESS
[de-identified] : 63 year old female follow-up with acute exacerbation of chronic back pain radiating down her right leg.  She denies injury.  She denies recent illness, fevers, numbness, weakness, balance problems, saddle anesthesia, urinary retention or fecal incontinence.  Her pain has continued. She is awaiting a lumbar epidural with Dr. Rosa.

## 2023-08-13 NOTE — PHYSICAL EXAM
[de-identified] : General: No acute distress, conversant, well-nourished.\par  Head: Normocephalic, atraumatic\par  Neck: trachea midline, FROM\par  Heart: normotensive and normal rate and rhythm\par  Lungs: No labored breathing\par  Skin: No abrasions, no rashes, no edema\par  Psych: Alert and oriented to person, place and time\par  Extremities: no peripheral edema or digital cyanosis\par  Gait: Normal gait. Can perform tandem gait.  \par  Vascular: warm and well perfused distally, palpable distal pulses\par  \par  MSK:\par  Right elbow\par  TTP over lateral epicondyle and extensor tendon origin\par  pain with resisted extension and passive flexion. \par  SILT m/r/u\par  +motor EPL/FPL/EDC/FDP/IO\par  WWP distally\par  \par  Lumbar spine:\par  No tenderness to palpation.  No step-off, no deformity.\par  \par  NEURO EXAM:\par  Sensation \par  Left L2  -  2/2            \par  Left L3  -  2/2\par  Left L4  -  2/2\par  Left L5  -  2/2\par  Left S1  -  2/2\par  \par  Right L2  -  2/2            \par  Right L3  -  2/2\par  Right L4  -  2/2\par  Right L5  -  2/2\par  Right S1  -  2/2\par  \par  Motor: \par  Left L2 (hip flexion)                            5/5                \par  Left L3 (knee extension)                   5/5                \par  Left L4 (ankle dorsiflexion)                 5/5                \par  Left L5 (long toe extensor)                5/5                \par  Left S1 (ankle plantar flexion)           5/5\par  \par  Right L2 (hip flexion)                            5/5                \par  Right L3 (knee extension)                   5/5                \par  Right L4 (ankle dorsiflexion)                 5/5                \par  Right L5 (long toe extensor)                5/5                \par  Right S1 (ankle plantar flexion)           5/5\par  \par  Reflexes: Normal and symmetric\par  Negative clonus.  Down-going Babinski.   [de-identified] : Right elbow MRI (7/3/23): 1. Findings consistent with marked lateral epicondylitis involving the common extensor tendon with an intrasubstance tear measuring approximately 1 cm in length.  Lumbar MRI (7/3/23): OSSEOUS STRUCTURES: Vertebral body heights are preserved. Degenerative endplate marrow signal changes are redemonstrated at L5-S1.  ALIGNMENT: Normal lumbar lordosis is preserved.  No significant scoliosis. No spondylolisthesis. No spondylolysis within the limitations of MRI.  SPINAL CORD AND CONUS MEDULLARIS: The conus medullaris terminates at the level of L1 and exhibits normal signal.  PARASPINAL AND INTRA-ABDOMINAL SOFT TISSUES: Unremarkable.  INCLUDED THORACIC SPINE AND SACRUM: Unremarkable.  DISCS: There is mild disc space narrowing and disc desiccation from L3-4 through L5-S1, not substantially changed.  The following axial levels are imaged and detailed below:  L1-L2: No disc bulging or herniation. No spinal canal or foraminal stenosis.  L2-L3: No disc bulging or herniation. No spinal canal or foraminal stenosis.  L3-L4: There is mild diffuse disc/osteophyte and facet arthrosis contributing to mild spinal canal stenosis. There is mild foraminal stenosis bilaterally. The findings are not substantially changed.  L4-L5: There is moderate diffuse disc/osteophyte and mild facet arthrosis contributing to mild spinal canal stenosis. There is contact of the traversing L5 nerve roots bilaterally. There is moderate foraminal stenosis bilaterally. Findings are similar compared to previous examination.  L5-S1: There is moderate diffuse disc/osteophyte asymmetric to the right, and mild to moderate facet arthrosis contributing to impingement of the ventral thecal sac, impingement of the traversing right greater than left S1 nerve roots, and severe right and mild left foraminal stenosis, not substantially changed.  Lumbar MRI (6/22/22): For purposes of this dictation, the last well-formed disc space will be labeled L5-S1.  OSSEOUS STRUCTURES: Vertebral body heights are preserved. There is redemonstration of degenerative endplate marrow edema at L5-S1 and to a lesser extent at L4-5.  ALIGNMENT: Normal lumbar lordosis is preserved. Mild dextroconvex lumbar curvature. No spondylolisthesis. No spondylolysis within the limitations of MRI.  SPINAL CORD AND CONUS MEDULLARIS: Conus medullaris terminates at the L1 level.  PARASPINAL AND INTRA-ABDOMINAL SOFT TISSUES: There is a partially imaged T2 hyperintensity in the right kidney. INCLUDED THORACIC SPINE AND SACRUM: Unremarkable. DISCS: There is mild disc space narrowing at L3-4 and multilevel disc desiccation from L3-4 through L5-S1, not substantially changed. The following axial levels are imaged and detailed below: L1-L2: No disc bulging or herniation. No spinal canal or foraminal stenosis. L2-L3: No disc bulging or herniation. No spinal canal or foraminal stenosis. L3-L4: There is mild diffuse disc bulging and mild to moderate facet arthrosis/ligamentum hypertrophy contributing to mild thecal sac impingement/mild spinal canal stenosis. There is mild bilateral foraminal narrowing. The findings are not substantially changed. L4-L5: There is moderate to marked diffuse disc bulging and mild to moderate facet arthrosis. There is mild spinal canal stenosis and contact of the traversing L5 nerve root sheaths bilaterally. There is moderate bilateral foraminal stenosis. The findings are not substantially changed. L5-S1: There is moderate to marked diffuse disc/osteophyte asymmetric to the right, and mild to moderate facet arthrosis contributing to impingement of the ventral thecal sac and traversing bilateral S1 nerve roots, severe right and mild left foraminal stenosis. The findings are not substantially changed.  Lumbar Radiographs (10/9/21): Dextroscoliosis of the lumbar spine approximately 8.7 degrees  Multilevel disc space narrowing with spondylosis and facet arthropathy, grossly unchanged from the prior study.  No spondylolisthesis or dynamic instability  Lumbar MRI (11/19/21): 1. L5-S1 annular bulge abutting the thecal sac with moderate foraminal narrowing.  2. L4-5 annular bulge with superimposed left-sided disc herniation and annular tear. There is bilateral recess stenosis and foraminal narrowing left significantly greater than right.  3. L3-4 annular bulge flattening the thecal sac with moderate foraminal narrowing.

## 2025-05-31 NOTE — PHYSICAL EXAM
[FreeTextEntry1] : PATRICIA is a 62 year old female \par Constitutional: healthy appearing, NAD, and normal body habitus\par \par LUMBAR\par ROM: flexion to 30 deg, ext to 5 deg\par \par Gait: normal\par \par Inspection: no erythema, warmth\par Spine: no TTP in spinous process\par Bony palpation: no TTP in GT\par \par Soft tissue palpation hip: TTP in right gluteus carl laterally\par Soft tissue palpation of spine: no TTP in lumbar paraspinals\par \par 5/5 bilateral KE, DF, PF \par sensation intact in bilat LE [At Term] : at term [[place] [Normal Vaginal Route] : by normal vaginal route [None] : there were no delivery complications [Age Appropriate] : age appropriate  [In Vitro Fertilization] : Pregnancy no in vitro fertilization [Jaundice] : not jaundice [Phototherapy] : no phototherapy [Transfusion] : no transfusion [NICU] : no NICU

## 2025-06-25 ENCOUNTER — APPOINTMENT (OUTPATIENT)
Dept: ORTHOPEDIC SURGERY | Facility: CLINIC | Age: 65
End: 2025-06-25
Payer: COMMERCIAL

## 2025-06-25 PROCEDURE — 72110 X-RAY EXAM L-2 SPINE 4/>VWS: CPT

## 2025-06-25 PROCEDURE — 99214 OFFICE O/P EST MOD 30 MIN: CPT

## 2025-06-25 RX ORDER — GABAPENTIN 300 MG/1
300 CAPSULE ORAL 3 TIMES DAILY
Qty: 90 | Refills: 1 | Status: ACTIVE | COMMUNITY
Start: 2025-06-25 | End: 1900-01-01

## 2025-06-25 RX ORDER — MELOXICAM 15 MG/1
15 TABLET ORAL
Qty: 30 | Refills: 1 | Status: ACTIVE | COMMUNITY
Start: 2025-06-25 | End: 1900-01-01

## 2025-08-18 ENCOUNTER — RX RENEWAL (OUTPATIENT)
Age: 65
End: 2025-08-18

## 2025-09-02 ENCOUNTER — APPOINTMENT (OUTPATIENT)
Dept: ORTHOPEDIC SURGERY | Facility: CLINIC | Age: 65
End: 2025-09-02
Payer: COMMERCIAL

## 2025-09-02 DIAGNOSIS — M47.816 SPONDYLOSIS W/OUT MYELOPATHY OR RADICULOPATHY, LUMBAR REGION: ICD-10-CM

## 2025-09-02 DIAGNOSIS — M79.18 MYALGIA, OTHER SITE: ICD-10-CM

## 2025-09-02 DIAGNOSIS — M54.16 RADICULOPATHY, LUMBAR REGION: ICD-10-CM

## 2025-09-02 PROCEDURE — 99214 OFFICE O/P EST MOD 30 MIN: CPT
